# Patient Record
Sex: MALE | Race: WHITE | Employment: UNEMPLOYED | URBAN - METROPOLITAN AREA
[De-identification: names, ages, dates, MRNs, and addresses within clinical notes are randomized per-mention and may not be internally consistent; named-entity substitution may affect disease eponyms.]

---

## 2019-12-17 ENCOUNTER — HOSPITAL ENCOUNTER (EMERGENCY)
Facility: CLINIC | Age: 44
Discharge: PSYCHIATRIC OR SUBSTANCE USE FACILITY | End: 2019-12-17

## 2019-12-17 VITALS
TEMPERATURE: 97.7 F | SYSTOLIC BLOOD PRESSURE: 157 MMHG | WEIGHT: 215 LBS | HEIGHT: 73 IN | HEART RATE: 100 BPM | RESPIRATION RATE: 16 BRPM | OXYGEN SATURATION: 99 % | BODY MASS INDEX: 28.49 KG/M2 | DIASTOLIC BLOOD PRESSURE: 105 MMHG

## 2019-12-17 ASSESSMENT — MIFFLIN-ST. JEOR: SCORE: 1919.11

## 2019-12-18 NOTE — ED NOTES
Joana notified that patient does not want to be seen and asked to send a ride back to pick him up.

## 2019-12-18 NOTE — ED TRIAGE NOTES
Headache onset 1530 today. Migraine by 1730. Hx of these but rare per pt report. No nausea or vomiting but photophobia present. Narcotics have not worked in the past per pt report. Imitrex has been best. At [facility] for stimulant treatment, states no specific one, has been there 3 weeks. Pt very concerned about insurance and billing. Wants UC charge if possible for tonight's visit.

## 2019-12-18 NOTE — ED NOTES
"Pt left department without being seen. Pt states guru offered him an imitrex for his headache, though wanted to be seen in ED and given IM injection for pain. Pt states \"I made a horrible mistake by coming, I just want to go home\". Pt again verified that he did not want to be seen.  RN to offer to call for a ride to [place], which he accepted.   "